# Patient Record
(demographics unavailable — no encounter records)

---

## 2025-07-22 NOTE — HISTORY OF PRESENT ILLNESS
[FreeTextEntry1] : 23 y/o M w/ Hx of DM1  initial evaluation and management of DM1 generally feels well and endorses no acute complaints. reports diagnosis at age 10. no recent hosp, ep of DKA or severe hypoglycemia. reports adherence to pump therapy. Past sensor use. ( medtronic 670G). reports no past use of sensor augmented pump therapy. has emergency pens and glucagon kit. admelog insulin. was told of microalbuminuria in the past. does not carb count, usually uses 3-10 units before meals. mostly eats lunch dinner. reports early AM hyperglycemia ~200. rare post prandial lows ~ 60. usually after using 10 units for large dinners of ~60-75 gm of carb.  1.2023 pump settings: I-C 1:8, CF 1:35, Basal 12 AM to 12 PM 1.15, 12 PM to 12 AM 1.4 (total 30 units)  7/2025 Here to re establish care, lost to f/u x 1 year, generally feels well and endorses no acute complaints. No interval events since LV. Today comes for DM1 f/u. reports no interval hosp or DKA/severe hypoglycemia episodes. now effectively using omnipod dash and DEXCOM g6,  carb counting w/ 1:10 carb ratio and correction for 1:30 above 150. using CGM at [facility] [place] time, FSG review shows FSG in AM ~120, post prandial ~ 150-190. no severe hypoglycemia. does endorse increased processed sugar intake. he otherwise denies any f/c, CP, SOB, palpitations, tremors, depressed mood, anxiety, palpitations, n/v, stool/urinary abn, skin/weight changes, heat/cold intolerance, HAs, breast/nipple changes, polyuria/polydipsia/nocturia or other complaints.

## 2025-07-22 NOTE — ASSESSMENT
[Long Term Vascular Complications] : long term vascular complications of diabetes [Carbohydrate Consistent Diet] : carbohydrate consistent diet [Importance of Diet and Exercise] : importance of diet and exercise to improve glycemic control, achieve weight loss and improve cardiovascular health [Hypoglycemia Management] : hypoglycemia management [Glucagon Use] : glucagon use [Action and use of Insulin] : action and use of short and long-acting insulin [Self Monitoring of Blood Glucose] : self monitoring of blood glucose [Injection Technique, Storage, Sharps Disposal] : injection technique, storage, and sharps disposal [FreeTextEntry1] : 1) DM1: controlled, A1C target of <7%. Natural hx of the disease and importance of treatment targets discussed at length, he verbalized understanding. ADA diet and importance of exercise discussed at length. Plan is to cont sensor augmented pump therapy w/ omnipod 5 + DEXCOM G6. Refer to Nutritionist today. 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 We karen check microalbumin, lipids and labs on the NV.     2) Elevated BP w/ reported microalb: Pt is at not goal BP and not on an ACE inh. Reassess microalbumin and start ACE inh.